# Patient Record
Sex: FEMALE | Race: WHITE | URBAN - METROPOLITAN AREA
[De-identification: names, ages, dates, MRNs, and addresses within clinical notes are randomized per-mention and may not be internally consistent; named-entity substitution may affect disease eponyms.]

---

## 2017-06-16 ENCOUNTER — ONCOLOGY VISIT (OUTPATIENT)
Dept: ONCOLOGY | Facility: CLINIC | Age: 27
End: 2017-06-16
Attending: INTERNAL MEDICINE
Payer: MEDICARE

## 2017-06-16 ENCOUNTER — OFFICE VISIT (OUTPATIENT)
Dept: CARE COORDINATION | Facility: CLINIC | Age: 27
End: 2017-06-16

## 2017-06-16 VITALS
OXYGEN SATURATION: 97 % | DIASTOLIC BLOOD PRESSURE: 77 MMHG | HEIGHT: 65 IN | HEART RATE: 100 BPM | BODY MASS INDEX: 27.16 KG/M2 | WEIGHT: 163 LBS | RESPIRATION RATE: 18 BRPM | SYSTOLIC BLOOD PRESSURE: 131 MMHG

## 2017-06-16 DIAGNOSIS — H54.7 BLINDNESS/LOW VISION: ICD-10-CM

## 2017-06-16 DIAGNOSIS — C69.22 RETINOBLASTOMA, BILATERAL (H): Primary | ICD-10-CM

## 2017-06-16 DIAGNOSIS — C41.9 OSTEOGENIC SARCOMA (H): ICD-10-CM

## 2017-06-16 DIAGNOSIS — C69.21 RETINOBLASTOMA, BILATERAL (H): Primary | ICD-10-CM

## 2017-06-16 DIAGNOSIS — Z71.9 ENCOUNTER FOR COUNSELING: Primary | ICD-10-CM

## 2017-06-16 PROCEDURE — 99212 OFFICE O/P EST SF 10 MIN: CPT | Mod: ZF

## 2017-06-16 RX ORDER — ZOLPIDEM TARTRATE 5 MG/1
5 TABLET ORAL PRN
COMMUNITY
Start: 2016-10-17

## 2017-06-16 ASSESSMENT — PAIN SCALES - GENERAL: PAINLEVEL: NO PAIN (0)

## 2017-06-16 NOTE — NURSING NOTE
"Oncology Rooming Note    June 16, 2017 10:53 AM   Madeleine Wiseman is a 26 year old female who presents for:    Chief Complaint   Patient presents with     Oncology Clinic Visit     Long Term F/U     Initial Vitals: /77 (BP Location: Right arm, Patient Position: Chair, Cuff Size: Adult Regular)  Pulse 100  Resp 18  Ht 1.651 m (5' 5\")  Wt 73.9 kg (163 lb)  SpO2 97%  BMI 27.12 kg/m2 Estimated body mass index is 27.12 kg/(m^2) as calculated from the following:    Height as of this encounter: 1.651 m (5' 5\").    Weight as of this encounter: 73.9 kg (163 lb). Body surface area is 1.84 meters squared.  No Pain (0) Comment: Data Unavailable   No LMP recorded.  Allergies reviewed: Yes  Medications reviewed: Yes    Medications: Medication refills not needed today.  Pharmacy name entered into EPIC: Data Unavailable    Clinical concerns:  No new concerns  provdier was notified.    7 minutes for nursing intake (face to face time)     Marcela Devine MA              "

## 2017-06-16 NOTE — LETTER
6/16/2017       RE: Madeleine Wiseman  [address] [address]     Dear Colleague,    Thank you for referring your patient, Madeleine Wiseman, to the [facility]. Please see a copy of my visit note below.    Dear Dr. Pozo,    It was my pleasure to see Madeleine Wiseman in our Longterm F/U Clinic for Childhood Cancer Survivors today, June 16, 2017. 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 She has had ups and downs, particularly after the death of her father in 2014 but seems to be doing quite well today and has taken ownership of her healthcare. Strong evidence of that is her establishment of primary care with you. I'll summarize my findings and recommendations below.    Cancer Diagnosis    Diagnosis: Retinoblastoma   Date of Diagnosis:  1/1991 Age at Diagnosis:  4 weeks    Sites involved/stage/diagnostic details: Bilateral     Hereditary/congenital history: Father had Retinoblastoma   Pertinent past medical history:       Treatment Center:  [facility] Medical Record #:[id number]     MD/APN Contact Information: Howie Lazar   Relapse(s)     Date:  Site(s):  Laterality:     Date Therapy Completed:    Subsequent Malignant Neoplasm YES   Date: 12/15/1999 Type:Osteogenic Sarcoma   Stage/Site(s): Right Temporal Skull Bone Date Therapy Completed: 10/18/2000   CANCER TREATMENT SUMMARY    Protocol     Acronym/Number Title/Description Initiated Completed On-Study   ICE   12/18/1999 10/18/2000 No   Surgery    Date  Procedure Site (if applicable) Laterality (if applicable) Surgeon/Institution   12/15/1999    6/8/2000 Biopsy of Osteogenic Sarcoma  Surgical Resection and Reconstruction Temporal Bone    Temporal Bone Right    Right   "  Chemotherapy      Drug Name Route Cumulative Dose   Ifosfamide  Etoposide  Carboplatin IV  IV  IV 72  GMs/m2  6.4  GMS/m2  4 GMS/m2     Radiation    Site/Field Laterality Start Stop Fractions Dose per Fraction (cGy) Total Dose (cGy) Type   Orbits Bilateral 1991 27  4860 cGy    Radiation oncologist: Stuart Crowe Institution: [facility]          Complications/Late Effects      Problem Date onset Date resolved Status   Second Malignancy  Facial Asymmetry  Orbital hypoplasia  Suicidal ideations    Decreased visual acuity  Depression  Anxiety  Insomnia  Social withdrawal  Dysmenorrhea  Migraine headaches           1/15/2008        Admitted for inpt evaluation after email sent to peers/teacher   Adverse Drug Reactions/Allergies      Drug Reaction Date Status   Vancomycin      Summary prepared by: Dianne Webber Date prepared:    Summary updated by: Rhina Gerardo Date updated: 6/15/17     Social History  Madeleine is now living independently in her own dwelling. She has a boyfriend and has SSI Disability (which she arranged for). She is not working. No tobacco or significant etoh. Her mother (who is here with her today) lives 3 miles away and provides transportation and support. Her father  in  and she had a period of grieving that was understandably difficult.    Family History  Father with RB and metastatic bladder cancer. This gives any offspring of hers a 50/50 chance of inheriting the RB gene mutation and 90% penetrance if inherited. This translates into a 45% chance for any offspring (or in her mom's words.... \"100% or 0%\").  At present Madeleine relates she has decided NOT to have children- she already has her \"fur babies\" - her cats).     Interval History    Madeleine reports she's doing well and has no complaints. She is living independently and has a boyfriend- feels safe. She has SSI disability and is working on further supplemental coverage. Her migraines (right " "javier-facial pain) occur weekly when stressed and monthly when not- unchanged in years. Nl menses. No lumps/bumps. No muscle or bone pain. She has established primary care with Dr. Heather Pozo and feels it is a great fit. She is making her own appointments and this is a big step forward in her transition to managing her own heathcare.    Review Of Systems  Skin: negative  Eyes: No changes- last eye exam 2 years ago - was told she did not need annual exams but to return with any changes. Low vision.  Ears/Nose/Throat: occasional allergies  Respiratory: No shortness of breath, dyspnea on exertion, cough, or hemoptysis  Cardiovascular: negative  Gastrointestinal: negative  Genitourinary: negative  Musculoskeletal: negative  Neurologic: negative  Psychiatric: sleep disturbance  Hematologic/Lymphatic/Immunologic: negative  Endocrine: negative    Exam: /77 (BP Location: Right arm, Patient Position: Chair, Cuff Size: Adult Regular)  Pulse 100  Resp 18  Ht 1.651 m (5' 5\")  Wt 73.9 kg (163 lb)  SpO2 97%  BMI 27.12 kg/m2    Constitutional: healthy, alert and no distress  Head: No masses, lesions, tenderness or abnormalities. Deepset eyes related to XRT to treat RB  Neck: Neck supple. No adenopathy. Thyroid symmetric, normal size,, Carotids without bruits.  ENT: ENT exam normal, no neck nodes or sinus tenderness  Cardiovascular: negative, PMI normal. No lifts, heaves, or thrills. RRR. No murmurs, clicks gallops or rub  Respiratory: negative, Percussion normal. Good diaphragmatic excursion. Lungs clear  Gastrointestinal: Abdomen soft, non-tender. BS normal. No masses, organomegaly  : Deferred  Musculoskeletal: extremities normal- no gross deformities noted, gait normal and normal muscle tone  Skin: no suspicious lesions or rashes  Neurologic: Gait normal. Reflexes normal and symmetric. Sensation grossly WNL.  Psychiatric: mentation appears normal and affect normal/bright  Hematologic/Lymphatic/Immunologic: Normal " cervical lymph nodes    Assessment/Plan:   1) Longterm follow-up of retinoblastoma. Madeleine is now 26 years old and has no evidence of retinoblastoma or recurrent osteogenic sarcoma at this time. She has had genetic counseling and does have up to a 45 percent likelihood of transmission of this condition to offspring and is aware of this (and has decided against having children). She has gone through normal puberty and while the alkalotic agents can have an effect on fertility, given the doses that she got and the normal puberty, for family planning purposes she should assume that everything is normal. Because she received ifosfamide, she had a CBC with diff yearly for the 10 years following completion of treatment for the osteogenic sarcoma and is now past the high risk period for secondary hematologic malignancy.  The carboplatin can have effects on the ears and kidneys and by history and exam there are no ear or hearing abnormalities and her blood pressure is excellent. The etoposide has not left any peripheral neuropathies.  The osteogenic sarcoma is more likely in patients who have had a retinoblastoma and she has had one malignancy and is aware that should she have any bone pain, immediate evaluation and radiographic imaging is appropriate. She is also aware of the need to monitor for any skin changes in the area where she received radiation and avoidance of known carcinogens such as tobacco and tobacco smoke are always good advice.    Discussion: In reviewing the medical literature on retinoblastoma the following information is summarized. RB makes up 3% of cancers in kids under age 15 with an annual incidence of 10-14 cases per 1 million children under age 5. RB occurs in hereditary (25%) and nonhereditary (75%) forms. For hereditary RB, the germline mutation may have been inherited from a parent (25%) or may have occurred at the time of conception in non-familial (sporadic) cases (75%). Patients with  successfully treated hereditary RB have a higher risk of subsequent neoplasms (SN) related to both the radiation treatment and the underlying gene mutation. Long-term follow-up studies have shown that 50 years after completion of therapy, around half of patients will have been diagnosed with a SN. Most common SNs were are osteosarcoma, soft-tissue sarcoma and melanoma with two thirds occuring in the radiation field and one third occurring outside of the radiation field. An increase in blood cell cancers (leukemia/lymphoma) was NOT noted in these patients that did not receive chemotherapy. It is also clear that with improvement in the delivery of focused radiation therapy, the incidence of SN has decreased (from 50% to 30% long-term).  2) Transition. Madeleine has taken significant ownership and responsibility for her care since last visit. She has established care and updated her immunizations and screening and feels very comfortable with Dr. Pozo. She has SSI Disability and insurance. She is doing well and It was great to see her today.  Overall, it is a pleasure to be involved in Madeleine's care. We discussed the transition process and I am hopeful that she will take on more responsibility for her preventive healthcare needs over the coming years and am happy to help with this process. Please do not hesitate to page me with any questions at [phone].   Sincerely,   Boo Good M.D.   Professor of Internal Medicine and Pediatrics  [employer]   Phone: [phone]       o [facility]    Again, thank you for allowing me to participate in the care of your patient.      Sincerely,    Boo Good MD

## 2017-06-16 NOTE — PROGRESS NOTES
Dear Dr. Pozo,    It was my pleasure to see Madeleine Wiseman in our Longterm F/U Clinic for Childhood Cancer Survivors today, June 16, 2017. 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 She has had ups and downs, particularly after the death of her father in 2014 but seems to be doing quite well today and has taken ownership of her healthcare. Strong evidence of that is her establishment of primary care with you. I'll summarize my findings and recommendations below.    Cancer Diagnosis    Diagnosis: Retinoblastoma   Date of Diagnosis:  1[birth year]1991 Age at Diagnosis:  4 weeks    Sites involved/stage/diagnostic details: Bilateral     Hereditary/congenital history: Father had Retinoblastoma   Pertinent past medical history:       Treatment Center:  [facility] Medical Record #:[id number]     MD/APN Contact Information: Howie Lazar   Relapse(s)     Date:  Site(s):  Laterality:     Date Therapy Completed:    Subsequent Malignant Neoplasm YES   Date: 12/15/1999 Type:Osteogenic Sarcoma   Stage/Site(s): Right Temporal Skull Bone Date Therapy Completed: 10/18/2000   CANCER TREATMENT SUMMARY    Protocol     Acronym/Number Title/Description Initiated Completed On-Study   ICE   12/18/1999 10/18/2000 No   Surgery    Date  Procedure Site (if applicable) Laterality (if applicable) Surgeon/Institution   12/15/1999    6/8/2000 Biopsy of Osteogenic Sarcoma  Surgical Resection and Reconstruction Temporal Bone    Temporal Bone Right    Right    Chemotherapy      Drug Name Route Cumulative Dose   Ifosfamide  Etoposide  Carboplatin IV  IV  IV 72  GMs/m2  6.4  GMS/m2  4 GMS/m2     Radiation    Site/Field Laterality Start Stop Fractions Dose per Fraction (cGy) Total Dose (cGy) Type   Orbits Bilateral  "1991 27  4860 cGy    Radiation oncologist: Stuart Crowe Institution: [facility]          Complications/Late Effects      Problem Date onset Date resolved Status   Second Malignancy  Facial Asymmetry  Orbital hypoplasia  Suicidal ideations    Decreased visual acuity  Depression  Anxiety  Insomnia  Social withdrawal  Dysmenorrhea  Migraine headaches           1/15/2008        Admitted for inpt evaluation after email sent to peers/teacher   Adverse Drug Reactions/Allergies      Drug Reaction Date Status   Vancomycin      Summary prepared by: Dianne Webber Date prepared:    Summary updated by: Rhina Gerardo Date updated: 6/15/17     Social History  Madeleine is now living independently in her own dwelling. She has a boyfriend and has SSI Disability (which she arranged for). She is not working. No tobacco or significant etoh. Her mother (who is here with her today) lives 3 miles away and provides transportation and support. Her father  in  and she had a period of grieving that was understandably difficult.    Family History  Father with RB and metastatic bladder cancer. This gives any offspring of hers a 50/50 chance of inheriting the RB gene mutation and 90% penetrance if inherited. This translates into a 45% chance for any offspring (or in her mom's words.... \"100% or 0%\").  At present Madeleine relates she has decided NOT to have children- she already has her \"fur babies\" - her cats).     Interval History    Madeleine reports she's doing well and has no complaints. She is living independently and has a boyfriend- feels safe. She has SSI disability and is working on further supplemental coverage. Her migraines (right javier-facial pain) occur weekly when stressed and monthly when not- unchanged in years. Nl menses. No lumps/bumps. No muscle or bone pain. She has established primary care with Dr. Heather Pozo and feels it is a great fit. She is making her own appointments and this " "is a big step forward in her transition to managing her own heathcare.    Review Of Systems  Skin: negative  Eyes: No changes- last eye exam 2 years ago - was told she did not need annual exams but to return with any changes. Low vision.  Ears/Nose/Throat: occasional allergies  Respiratory: No shortness of breath, dyspnea on exertion, cough, or hemoptysis  Cardiovascular: negative  Gastrointestinal: negative  Genitourinary: negative  Musculoskeletal: negative  Neurologic: negative  Psychiatric: sleep disturbance  Hematologic/Lymphatic/Immunologic: negative  Endocrine: negative    Exam: /77 (BP Location: Right arm, Patient Position: Chair, Cuff Size: Adult Regular)  Pulse 100  Resp 18  Ht 1.651 m (5' 5\")  Wt 73.9 kg (163 lb)  SpO2 97%  BMI 27.12 kg/m2    Constitutional: healthy, alert and no distress  Head: No masses, lesions, tenderness or abnormalities. Deepset eyes related to XRT to treat RB  Neck: Neck supple. No adenopathy. Thyroid symmetric, normal size,, Carotids without bruits.  ENT: ENT exam normal, no neck nodes or sinus tenderness  Cardiovascular: negative, PMI normal. No lifts, heaves, or thrills. RRR. No murmurs, clicks gallops or rub  Respiratory: negative, Percussion normal. Good diaphragmatic excursion. Lungs clear  Gastrointestinal: Abdomen soft, non-tender. BS normal. No masses, organomegaly  : Deferred  Musculoskeletal: extremities normal- no gross deformities noted, gait normal and normal muscle tone  Skin: no suspicious lesions or rashes  Neurologic: Gait normal. Reflexes normal and symmetric. Sensation grossly WNL.  Psychiatric: mentation appears normal and affect normal/bright  Hematologic/Lymphatic/Immunologic: Normal cervical lymph nodes    Assessment/Plan:   1) Longterm follow-up of retinoblastoma. Madeleine is now 26 years old and has no evidence of retinoblastoma or recurrent osteogenic sarcoma at this time. She has had genetic counseling and does have up to a 45 percent " likelihood of transmission of this condition to offspring and is aware of this (and has decided against having children). She has gone through normal puberty and while the alkalotic agents can have an effect on fertility, given the doses that she got and the normal puberty, for family planning purposes she should assume that everything is normal. Because she received ifosfamide, she had a CBC with diff yearly for the 10 years following completion of treatment for the osteogenic sarcoma and is now past the high risk period for secondary hematologic malignancy.  The carboplatin can have effects on the ears and kidneys and by history and exam there are no ear or hearing abnormalities and her blood pressure is excellent. The etoposide has not left any peripheral neuropathies.  The osteogenic sarcoma is more likely in patients who have had a retinoblastoma and she has had one malignancy and is aware that should she have any bone pain, immediate evaluation and radiographic imaging is appropriate. She is also aware of the need to monitor for any skin changes in the area where she received radiation and avoidance of known carcinogens such as tobacco and tobacco smoke are always good advice.    Discussion: In reviewing the medical literature on retinoblastoma the following information is summarized. RB makes up 3% of cancers in kids under age 15 with an annual incidence of 10-14 cases per 1 million children under age 5. RB occurs in hereditary (25%) and nonhereditary (75%) forms. For hereditary RB, the germline mutation may have been inherited from a parent (25%) or may have occurred at the time of conception in non-familial (sporadic) cases (75%). Patients with successfully treated hereditary RB have a higher risk of subsequent neoplasms (SN) related to both the radiation treatment and the underlying gene mutation. Long-term follow-up studies have shown that 50 years after completion of therapy, around half of patients will  have been diagnosed with a SN. Most common SNs were are osteosarcoma, soft-tissue sarcoma and melanoma with two thirds occuring in the radiation field and one third occurring outside of the radiation field. An increase in blood cell cancers (leukemia/lymphoma) was NOT noted in these patients that did not receive chemotherapy. It is also clear that with improvement in the delivery of focused radiation therapy, the incidence of SN has decreased (from 50% to 30% long-term).  2) Transition. Madeleine has taken significant ownership and responsibility for her care since last visit. She has established care and updated her immunizations and screening and feels very comfortable with Dr. Pozo. She has SSI Disability and insurance. She is doing well and It was great to see her today.  Overall, it is a pleasure to be involved in Madeleine's care. We discussed the transition process and I am hopeful that she will take on more responsibility for her preventive healthcare needs over the coming years and am happy to help with this process. Please do not hesitate to page me with any questions at [phone].   Sincerely,   Boo Good M.D.   Professor of Internal Medicine and Pediatrics  [employer]   Phone: [phone]

## 2017-06-16 NOTE — PROGRESS NOTES
Long-Term Follow-up/[facility]       Psychosocial Assessment    Assessment completed of living situation, support system, financial status, functional status, coping, stressors, need for resources and social work intervention provided as needed.    Diagnosis: Diagnosed with retinoblastoma in 01/1991 at one month old; diagnosed with osteogenic sarcoma of bone in 12/1999.    Provider: Dr. Boo Good.    Presenting Information: Madeleine is a 26 year-old, female, who presents to her LTFU clinic visit with her mom.    Living Situation: Madeleine lives with her boyfriend in [facility] in [place], [place].  They also have five cats.    Decision Making: Self.    Relationship Status: Madeleine has been in a relationship with her boyfriend for over a year and reports feeling safe and supported.    Transportation Mode: Madeleine's mom drove her to her appointment.  She is unable to drive due to poor vision.    Family/Support System: Madeleine's support consists of her mom, boyfriend, and family friends.  Support is reportedly adequate.    Spirituality: Scientology.    Insurance: Madeleine is covered by Medicare and [place].  Coverage is reportedly adequate.    Employment/Finances: Madeleine is not working.  She began receiving disability benefits after she appealed the decision.  Her boyfriend works full-time doing construction work.  Finances are reportedly adequate.    Patient Education/Development Level: Madeleine completed all schooling in [place], [place], where she was born and raised.  She graduated from highschool at [employer] but did not pursue college following graduation.  Madeleine is not interested in furthering her education at this time.  Madeleine had an IEP in place when she attended school, with focus placed on her vision needs.  She worked closely with [employer].      Mental Health: Madeleine has a reported history of severe depression and OCD.  She has been hospitalized in the past for suicidal ideation.   Madeleine states her mental health has improved tremendously since our last visit.  She is no longer seeing her therapist, Carine Vargas, as she met her goals.  Carine is available to see Madeleine should she decide to in the future.  She continues to take Zoloft and Trazadone.  She states Zoloft is very helpful.  Her mental health seems to be well managed at this time.    Emotional/Social/Cogntive Effects: Madeleine seems to be doing well in general.  She has worked through her depression and continues to take medication, which she states she will stay on long-term.  She has good support in place and denies any concens in relation to her cognition.      Assessment and Recommendations for the Team: Madeleine appears to be doing well overall.  She is well supported by her mom and boyfriend.  She is no longer working and is feeling much better since she was approved for disability.  Her mental health has improved tremendously as well.  On-going medications to help manage her mood would be recommended as she states it has been very helpful.  She can notice a change if she misses a dose and/or runs out of medication.  Insurance coverage/finances are reportedly adequate.  Psychosocial barriers were not identified.    Interventions:  1. Assessed immediate needs and completed a psychosocial assessment.  2. Encouraged Madeleine to contact this writer should any questions/concerns arise before her next clinic visit.      Sosa Nava [facility]  Clinical   [facility]  [phone]

## 2017-06-16 NOTE — MR AVS SNAPSHOT
"              After Visit Summary   2017    Madeleine Wiseman    MRN: [id number]           Patient Information     Date Of Birth          1990        Visit Information        Provider Department      2017 1:31 PM Sosa Nava MSW [facility]        Today's Diagnoses     Encounter for counseling    -  1       Follow-ups after your visit        Who to contact     If you have questions or need follow up information about today's clinic visit or your schedule please contact [facility] directly at No information on file..  Normal or non-critical lab and imaging results will be communicated to you by MyChart, letter or phone within 4 business days after the clinic has received the results. If you do not hear from us within 7 days, please contact the clinic through [employer] or phone. If you have a critical or abnormal lab result, we will notify you by phone as soon as possible.  Submit refill requests through [email or web address] or call your pharmacy and they will forward the refill request to us. Please allow 3 business days for your refill to be completed.          Additional Information About Your Visit        MyChart Information     [email or web address] lets you send messages to your doctor, view your test results, renew your prescriptions, schedule appointments and more. To sign up, go to [email or web address] . Click on \"Log in\" on the left side of the screen, which will take you to the Welcome page. Then click on \"Sign up Now\" on the right side of the page.     You will be asked to enter the access code listed below, as well as some personal information. Please follow the directions to create your username and password.     Your access code is: [email or web address]  Expires: 2017  6:30 AM     Your access code will  in 90 days. If you need help or a new code, please call your [facility] clinic or [phone].        Care EveryWhere ID     This is your Care EveryWhere ID. This could be used by other " organizations to access your [place] medical records  [id number]         Blood Pressure from Last 3 Encounters:   06/16/17 131/77   10/17/14 115/80    Weight from Last 3 Encounters:   06/16/17 73.9 kg (163 lb)   10/17/14 74.3 kg (163 lb 14.4 oz)              Today, you had the following     No orders found for display       Primary Care Provider Office Phone # Fax #    Heather Barton [phone]       [facility] [place] [address]  [place] [address]        Thank you!     Thank you for choosing [facility]  for your care. Our goal is always to provide you with excellent care. Hearing back from our patients is one way we can continue to improve our services. Please take a few minutes to complete the written survey that you may receive in the mail after your visit with us. Thank you!             Your Updated Medication List - Protect others around you: Learn how to safely use, store and throw away your medicines at www.disposemymeds.org.          This list is accurate as of: 6/16/17  2:06 PM.  Always use your most recent med list.                   Brand Name Dispense Instructions for use    etonogestrel 68 MG Impl    IMPLANON/NEXPLANON     Inject 68 mg Subcutaneous daily       TRAZODONE HCL PO      Take 10 mg by mouth       ZOLOFT PO      Take 100 mg by mouth daily       zolpidem 5 MG tablet    AMBIEN     Take 5 mg by mouth as needed